# Patient Record
Sex: MALE | Race: WHITE | URBAN - METROPOLITAN AREA
[De-identification: names, ages, dates, MRNs, and addresses within clinical notes are randomized per-mention and may not be internally consistent; named-entity substitution may affect disease eponyms.]

---

## 2024-10-17 ENCOUNTER — OUTSIDE SERVICES (OUTPATIENT)
Dept: PRIMARY CARE CLINIC | Age: 89
End: 2024-10-17

## 2024-10-17 DIAGNOSIS — E78.2 MIXED HYPERLIPIDEMIA: ICD-10-CM

## 2024-10-17 DIAGNOSIS — M15.0 PRIMARY OSTEOARTHRITIS INVOLVING MULTIPLE JOINTS: ICD-10-CM

## 2024-10-17 DIAGNOSIS — E11.9 TYPE 2 DIABETES MELLITUS WITHOUT COMPLICATION, WITHOUT LONG-TERM CURRENT USE OF INSULIN (HCC): ICD-10-CM

## 2024-10-17 DIAGNOSIS — K21.9 GASTROESOPHAGEAL REFLUX DISEASE WITHOUT ESOPHAGITIS: ICD-10-CM

## 2024-10-17 DIAGNOSIS — R42 LIGHTHEADEDNESS: Primary | ICD-10-CM

## 2024-10-17 NOTE — PROGRESS NOTES
10/17/2024    Floyd Cummings  12/28/1930    This resident is being seen today for a skilled evaluation visit.  He is a resident who has long-term medical conditions including atherosclerotic heart disease, atrial fibrillation, gastroesophageal reflux disease, hyperlipidemia, hypertension, hypertensive heart disease with heart failure, falls, polyosteoarthritis, neoplasm of the prostate, myocardial infarction, urinary retention, conjunctival hemorrhage to the right eye, cholelithiasis without obstruction, diverticulosis, surgical history of coronary angioplasty implant with grafting, bilateral hip replacements.  He is a 89 y.o. male resident who is being seen today for skilled therapy services with this resident admitted here for PT/OT.  He initially presented to the emergency room department with complaints of a fall was apparently multiple presyncopal events prior to going to the emergency room.  He had been seen in conjunction with his primary care physician who thought he had some degree of orthostasis and was taken off of his lisinopril.  He admits that he does have what he would describe is lightheadedness from time to time.  With his last fall he did hit the right side of his face and abdomen.  Testing was completed in the hospital setting including a CT of the head without any acute process noted and furthermore CT of the chest was obtained as well as the abdomen and pelvis which shows showed some small bilateral pleural effusions but otherwise unremarkable.  He does admit that this time that he has some underlying lightheadedness from time to time and that he becomes short of breath with walking.  He denies any chest pain or pressure, nausea or vomiting, constipation or diarrhea, or fever or chills.        Medications:  Tamsulosin 0.8 mg twice daily  Nitroglycerin tablet 0.21 g as needed  Omeprazole 20 mg daily  Atorvastatin 20 mg at bedtime  Ocupress ophthalmic drops 1 drop daily  Centrum Silver